# Patient Record
Sex: FEMALE | Race: WHITE | NOT HISPANIC OR LATINO | Employment: FULL TIME | URBAN - METROPOLITAN AREA
[De-identification: names, ages, dates, MRNs, and addresses within clinical notes are randomized per-mention and may not be internally consistent; named-entity substitution may affect disease eponyms.]

---

## 2018-03-09 DIAGNOSIS — M25.561 RIGHT KNEE PAIN, UNSPECIFIED CHRONICITY: Primary | ICD-10-CM

## 2018-06-07 ENCOUNTER — TELEPHONE (OUTPATIENT)
Dept: ORTHOPEDICS | Facility: CLINIC | Age: 23
End: 2018-06-07

## 2018-06-07 NOTE — TELEPHONE ENCOUNTER
Reprinted order from [facility] and faxed to [facility]----- Message from Jesenia Nunez sent at 6/6/2018  3:19 PM CDT -----  Contact: patient  Call patient, [phone], she was supposed to get an MRI done and did not at the time but now can so can we send another order in to get it done? Her knee is still acting up

## 2018-08-15 ENCOUNTER — OFFICE VISIT (OUTPATIENT)
Dept: ORTHOPEDICS | Facility: CLINIC | Age: 23
End: 2018-08-15
Payer: COMMERCIAL

## 2018-08-15 VITALS
SYSTOLIC BLOOD PRESSURE: 106 MMHG | WEIGHT: 165 LBS | HEIGHT: 65 IN | DIASTOLIC BLOOD PRESSURE: 70 MMHG | HEART RATE: 57 BPM | BODY MASS INDEX: 27.49 KG/M2

## 2018-08-15 DIAGNOSIS — S83.281A ACUTE LATERAL MENISCUS TEAR OF RIGHT KNEE, INITIAL ENCOUNTER: Primary | ICD-10-CM

## 2018-08-15 DIAGNOSIS — S83.511A RUPTURE OF ANTERIOR CRUCIATE LIGAMENT OF RIGHT KNEE, INITIAL ENCOUNTER: ICD-10-CM

## 2018-08-15 PROCEDURE — 99213 OFFICE O/P EST LOW 20 MIN: CPT | Mod: ,,, | Performed by: ORTHOPAEDIC SURGERY
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
## 2018-09-11 ENCOUNTER — TELEPHONE (OUTPATIENT)
Dept: ORTHOPEDICS | Facility: CLINIC | Age: 23
End: 2018-09-11

## 2018-09-11 NOTE — TELEPHONE ENCOUNTER
----- Message from Caroline Powell sent at 9/11/2018 12:07 PM CDT -----  (dr mills) patient called she missed your phone call [phone]

## 2018-10-23 ENCOUNTER — TELEPHONE (OUTPATIENT)
Dept: ORTHOPEDICS | Facility: CLINIC | Age: 23
End: 2018-10-23

## 2018-10-23 NOTE — TELEPHONE ENCOUNTER
Patient has recently received [place] medicaid and needs me to send her records to her new orthopedic Dr. [phone]

## 2018-10-23 NOTE — TELEPHONE ENCOUNTER
----- Message from Elizabeth Patrick sent at 10/22/2018  2:07 PM CDT -----  Contact: Pt called  [phone]  Pt need referral to see Dr Ding orthopedic [phone]

## 2020-10-26 ENCOUNTER — TELEPHONE (OUTPATIENT)
Dept: SURGERY | Facility: CLINIC | Age: 25
End: 2020-10-26

## 2020-10-26 NOTE — TELEPHONE ENCOUNTER
Writer phoned pt regarding referral to General Surgery. Pt stated that her abscess has improved, and that she felt she did not need to schedule at this time. Writer expressed understanding and provided Ms. Dumont with contact information for clinic if she changed her mind

## 2023-02-08 ENCOUNTER — OFFICE VISIT (OUTPATIENT)
Dept: FAMILY MEDICINE | Facility: CLINIC | Age: 28
End: 2023-02-08
Payer: COMMERCIAL

## 2023-02-08 VITALS
BODY MASS INDEX: 28.38 KG/M2 | HEART RATE: 66 BPM | HEIGHT: 66 IN | SYSTOLIC BLOOD PRESSURE: 110 MMHG | WEIGHT: 176.56 LBS | DIASTOLIC BLOOD PRESSURE: 68 MMHG | OXYGEN SATURATION: 98 % | TEMPERATURE: 98 F

## 2023-02-08 DIAGNOSIS — Z92.29 HISTORY OF MMR VACCINATION: Primary | ICD-10-CM

## 2023-02-08 PROCEDURE — 3008F BODY MASS INDEX DOCD: CPT | Mod: CPTII,,,

## 2023-02-08 PROCEDURE — 3074F SYST BP LT 130 MM HG: CPT | Mod: CPTII,,,

## 2023-02-08 PROCEDURE — 1160F RVW MEDS BY RX/DR IN RCRD: CPT | Mod: CPTII,,,

## 2023-02-08 PROCEDURE — 3078F PR MOST RECENT DIASTOLIC BLOOD PRESSURE < 80 MM HG: ICD-10-PCS | Mod: CPTII,,,

## 2023-02-08 PROCEDURE — 3078F DIAST BP <80 MM HG: CPT | Mod: CPTII,,,

## 2023-02-08 PROCEDURE — 99204 PR OFFICE/OUTPT VISIT, NEW, LEVL IV, 45-59 MIN: ICD-10-PCS | Mod: ,,,

## 2023-02-08 PROCEDURE — 3008F PR BODY MASS INDEX (BMI) DOCUMENTED: ICD-10-PCS | Mod: CPTII,,,

## 2023-02-08 PROCEDURE — 1159F MED LIST DOCD IN RCRD: CPT | Mod: CPTII,,,

## 2023-02-08 PROCEDURE — 1159F PR MEDICATION LIST DOCUMENTED IN MEDICAL RECORD: ICD-10-PCS | Mod: CPTII,,,

## 2023-02-08 PROCEDURE — 1160F PR REVIEW ALL MEDS BY PRESCRIBER/CLIN PHARMACIST DOCUMENTED: ICD-10-PCS | Mod: CPTII,,,

## 2023-02-08 PROCEDURE — 3074F PR MOST RECENT SYSTOLIC BLOOD PRESSURE < 130 MM HG: ICD-10-PCS | Mod: CPTII,,,

## 2023-02-08 PROCEDURE — 99204 OFFICE O/P NEW MOD 45 MIN: CPT | Mod: ,,,

## 2023-02-08 NOTE — PROGRESS NOTES
Subjective:       Patient ID: Nuvia Dumont is a 27 y.o. female.    Chief Complaint: Establish Care (Discuss labs needed for the nursing program and get possible orders)    Patient presents to the clinic to establish care.     She needs lab work showing MMR status.     Has no complaints or concerns today.     Patient educated on plan of care, verbalized understanding.        Review of Systems   Constitutional:  Negative for activity change, appetite change, chills, diaphoresis and fever.   HENT:  Negative for congestion, ear pain, postnasal drip, sinus pressure, sneezing and sore throat.    Eyes:  Negative for pain, discharge, redness and itching.   Respiratory:  Negative for apnea, cough, chest tightness, shortness of breath and wheezing.    Cardiovascular:  Negative for chest pain and leg swelling.   Gastrointestinal:  Negative for abdominal distention, abdominal pain, constipation, diarrhea, nausea and vomiting.   Genitourinary:  Negative for difficulty urinating, dysuria, flank pain and frequency.   Skin:  Negative for color change, rash and wound.   Neurological:  Negative for dizziness.   All other systems reviewed and are negative.    There is no problem list on file for this patient.      Objective:      Physical Exam  Vitals and nursing note reviewed.   Constitutional:       General: She is not in acute distress.     Appearance: Normal appearance. She is well-developed.   HENT:      Head: Normocephalic.      Nose: Nose normal.   Eyes:      Conjunctiva/sclera: Conjunctivae normal.      Pupils: Pupils are equal, round, and reactive to light.   Cardiovascular:      Rate and Rhythm: Normal rate and regular rhythm.      Heart sounds: Normal heart sounds.   Pulmonary:      Effort: Pulmonary effort is normal. No respiratory distress.      Breath sounds: Normal breath sounds.   Musculoskeletal:      Cervical back: Normal range of motion and neck supple.   Skin:     General: Skin is warm and dry.      Findings: No  "rash.   Neurological:      Mental Status: She is alert and oriented to person, place, and time.   Psychiatric:         Behavior: Behavior normal.       Lab Results   Component Value Date    HGBA1C 5.2 07/08/2020     The ASCVD Risk score (Jammie FAYE, et al., 2019) failed to calculate for the following reasons:    The 2019 ASCVD risk score is only valid for ages 40 to 79  Visit Vitals  /68 (BP Location: Left arm, Patient Position: Sitting, BP Method: Medium (Manual))   Pulse 66   Temp 98.1 °F (36.7 °C) (Temporal)   Ht 5' 6" (1.676 m)   Wt 80.1 kg (176 lb 9.4 oz)   LMP 02/06/2023   SpO2 98%   BMI 28.50 kg/m²      Assessment:       1. History of MMR vaccination        Plan:       1. History of MMR vaccination  -     Mumps, IgG Screen; Future; Expected date: 02/08/2023  -     Rubeola antibody IgG; Future; Expected date: 02/08/2023  -     Rubella antibody, IgG; Future; Expected date: 02/08/2023       Follow up if symptoms worsen or fail to improve.      Future Appointments       Date Specialty Appt Notes    2/9/2023 Lab Z92.29]             "

## 2023-02-08 NOTE — PATIENT INSTRUCTIONS

## 2023-02-09 ENCOUNTER — LAB VISIT (OUTPATIENT)
Dept: LAB | Facility: CLINIC | Age: 28
End: 2023-02-09
Payer: COMMERCIAL

## 2023-02-09 DIAGNOSIS — Z92.29 HISTORY OF MMR VACCINATION: ICD-10-CM

## 2023-02-09 PROCEDURE — 86762 RUBELLA ANTIBODY: CPT

## 2023-02-09 PROCEDURE — 86765 RUBEOLA ANTIBODY: CPT

## 2023-02-09 PROCEDURE — 86735 MUMPS ANTIBODY: CPT

## 2023-02-10 LAB
MUMPS IGG INTERPRETATION: NEGATIVE
MUMPS IGG SCREEN: 8.82 AU/ML
RUBEOLA IGG ANTIBODY: 54 AU/ML
RUBEOLA INTERPRETATION: POSITIVE
RUBV IGG SER-ACNC: 13.6 IU/ML
RUBV IGG SER-IMP: REACTIVE

## 2023-10-13 NOTE — TELEPHONE ENCOUNTER
Pre-Endoscopy Evaluation    Referring Physician:                                    Indication for Procedure:  surveillance colon/ heartburn    Sedation by Anesthesia [X ]    PAST MEDICAL & SURGICAL HISTORY:  No pertinent past medical history      No significant past surgical history          Latex allergy: [ ] yes [X] no    Smoking: [ ] yes  [X] no    AICD/PPM: [ ] yes   [X] no    Pertinent lab data:                      Physical Examination:  Daily Height in cm: 165.1 (13 Oct 2023 10:31)    Daily   Vital Signs Last 24 Hrs  T(C): 36.4 (13 Oct 2023 10:31), Max: 36.4 (13 Oct 2023 10:31)  T(F): 97.5 (13 Oct 2023 10:31), Max: 97.5 (13 Oct 2023 10:31)  HR: 77 (13 Oct 2023 10:31) (77 - 77)  BP: 134/77 (13 Oct 2023 10:31) (134/77 - 134/77)  BP(mean): --  RR: 20 (13 Oct 2023 10:31) (20 - 20)  SpO2: 99% (13 Oct 2023 10:31) (99% - 99%)    Parameters below as of 13 Oct 2023 10:31  Patient On (Oxygen Delivery Method): room air        Constitutional: NAD    HEENT: PERRLA, EOMI,       Neck:  No JVD    Respiratory: CTAB/L    Cardiovascular: S1 and S2    Gastrointestinal: BS+, soft, NT/ND    Extremities: No peripheral edema    Neurological: A/O x 3, no focal deficits    Psychiatric: Normal mood, normal affect    : No Solorzano    Skin: No rashes    Comments:    ASA Class: I [ ]  II [X ]  III [ ]  IV [ ]    The patient is a suitable candidate for the planned procedure unless box checked [ ]  No, explain:   ----- Message from Efra Miller sent at 9/10/2018  4:06 PM CDT -----  Patient was calling in regards to setting up surgery